# Patient Record
Sex: FEMALE | Race: WHITE
[De-identification: names, ages, dates, MRNs, and addresses within clinical notes are randomized per-mention and may not be internally consistent; named-entity substitution may affect disease eponyms.]

---

## 2017-12-21 ENCOUNTER — HOSPITAL ENCOUNTER (EMERGENCY)
Dept: HOSPITAL 61 - ER | Age: 32
Discharge: HOME | End: 2017-12-21
Payer: SELF-PAY

## 2017-12-21 VITALS — HEIGHT: 67 IN | WEIGHT: 150 LBS | BODY MASS INDEX: 23.54 KG/M2

## 2017-12-21 VITALS — DIASTOLIC BLOOD PRESSURE: 74 MMHG | SYSTOLIC BLOOD PRESSURE: 135 MMHG

## 2017-12-21 DIAGNOSIS — S39.011A: Primary | ICD-10-CM

## 2017-12-21 DIAGNOSIS — Z87.442: ICD-10-CM
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
## 2017-12-21 LAB
ALBUMIN SERPL-MCNC: 4.1 G/DL (ref 3.4–5)
ALBUMIN/GLOB SERPL: 1.2 {RATIO} (ref 1–1.7)
ALP SERPL-CCNC: 56 U/L (ref 46–116)
ALT SERPL-CCNC: 20 U/L (ref 14–59)
ANION GAP SERPL CALC-SCNC: 9 MMOL/L (ref 6–14)
AST SERPL-CCNC: 17 U/L (ref 15–37)
BACTERIA #/AREA URNS HPF: (no result) /HPF
BASOPHILS # BLD AUTO: 0 X10^3/UL (ref 0–0.2)
BASOPHILS NFR BLD: 1 % (ref 0–3)
BILIRUB SERPL-MCNC: 0.5 MG/DL (ref 0.2–1)
BILIRUB UR QL STRIP: NEGATIVE
BUN SERPL-MCNC: 10 MG/DL (ref 7–20)
BUN/CREAT SERPL: 13 (ref 6–20)
CALCIUM SERPL-MCNC: 8.8 MG/DL (ref 8.5–10.1)
CHLORIDE SERPL-SCNC: 104 MMOL/L (ref 98–107)
CO2 SERPL-SCNC: 29 MMOL/L (ref 21–32)
CREAT SERPL-MCNC: 0.8 MG/DL (ref 0.6–1)
EOSINOPHIL NFR BLD: 1 % (ref 0–3)
ERYTHROCYTE [DISTWIDTH] IN BLOOD BY AUTOMATED COUNT: 13.9 % (ref 11.5–14.5)
GFR SERPLBLD BASED ON 1.73 SQ M-ARVRAT: 83.1 ML/MIN
GLOBULIN SER-MCNC: 3.4 G/DL (ref 2.2–3.8)
GLUCOSE SERPL-MCNC: 93 MG/DL (ref 70–99)
GLUCOSE UR STRIP-MCNC: NEGATIVE MG/DL
HCT VFR BLD CALC: 44.3 % (ref 36–47)
HGB BLD-MCNC: 14.8 G/DL (ref 12–15.5)
LYMPHOCYTES # BLD: 2.4 X10^3/UL (ref 1–4.8)
LYMPHOCYTES NFR BLD AUTO: 37 % (ref 24–48)
MCH RBC QN AUTO: 32 PG (ref 25–35)
MCHC RBC AUTO-ENTMCNC: 34 G/DL (ref 31–37)
MCV RBC AUTO: 95 FL (ref 79–100)
MONOCYTES NFR BLD: 7 % (ref 0–9)
NEUTROPHILS NFR BLD AUTO: 55 % (ref 31–73)
NITRITE UR QL STRIP: NEGATIVE
PH UR STRIP: 7 [PH]
PLATELET # BLD AUTO: 235 X10^3/UL (ref 140–400)
POTASSIUM SERPL-SCNC: 3.9 MMOL/L (ref 3.5–5.1)
PROT SERPL-MCNC: 7.5 G/DL (ref 6.4–8.2)
PROT UR STRIP-MCNC: NEGATIVE MG/DL
RBC # BLD AUTO: 4.68 X10^6/UL (ref 3.5–5.4)
RBC #/AREA URNS HPF: 0 /HPF (ref 0–2)
SODIUM SERPL-SCNC: 142 MMOL/L (ref 136–145)
SP GR UR STRIP: 1.01
SQUAMOUS #/AREA URNS LPF: (no result) /LPF
UROBILINOGEN UR-MCNC: 0.2 MG/DL
WBC # BLD AUTO: 6.5 X10^3/UL (ref 4–11)
WBC #/AREA URNS HPF: (no result) /HPF (ref 0–4)

## 2017-12-21 PROCEDURE — 99284 EMERGENCY DEPT VISIT MOD MDM: CPT

## 2017-12-21 PROCEDURE — 36415 COLL VENOUS BLD VENIPUNCTURE: CPT

## 2017-12-21 PROCEDURE — 81001 URINALYSIS AUTO W/SCOPE: CPT

## 2017-12-21 PROCEDURE — 85025 COMPLETE CBC W/AUTO DIFF WBC: CPT

## 2017-12-21 PROCEDURE — 96374 THER/PROPH/DIAG INJ IV PUSH: CPT

## 2017-12-21 PROCEDURE — 80053 COMPREHEN METABOLIC PANEL: CPT

## 2017-12-21 PROCEDURE — 96375 TX/PRO/DX INJ NEW DRUG ADDON: CPT

## 2017-12-21 PROCEDURE — 96361 HYDRATE IV INFUSION ADD-ON: CPT

## 2017-12-21 NOTE — PHYS DOC
Adult General


Chief Complaint


Chief Complaint:  FLANK PAIN





HPI


HPI





Patient is a 32  year old female presents to the emergency department with a 24-

hour history of right flank pain. Patient states she has a history of renal 

stones in his had previous lithotripsy and surgical removal of the stones 

however she cannot recall the facility where these procedures occurred. Patient 

states she's had no nausea, no vomiting, no urinary symptoms. The pain is 

localized to the right flank.





Review of Systems


Review of Systems





Constitutional: Denies fever or chills []


Eyes: Denies change in visual acuity, redness, or eye pain []


HENT: Denies nasal congestion or sore throat []


Respiratory: Denies cough or shortness of breath []


Cardiovascular: No additional information not addressed in HPI []


GI: Denies abdominal pain, nausea, vomiting, bloody stools or diarrhea []


: Denies dysuria or hematuria []


Musculoskeletal: Denies back pain or joint pain, complain of right flank pain []


Integument: Denies rash or skin lesions []


Neurologic: Denies headache, focal weakness or sensory changes []


Endocrine: Denies polyuria or polydipsia []





All other systems were reviewed and found to be within normal limits, except as 

documented in this note.





Current Medications


Current Medications





Current Medications








 Medications


  (Trade)  Dose


 Ordered  Sig/Holger  Start Time


 Stop Time Status Last Admin


Dose Admin


 


 Ketorolac


 Tromethamine


  (Toradol)  30 mg  1X  ONCE  12/21/17 11:00


 12/21/17 11:01 DC 12/21/17 11:09


30 MG


 


 Ondansetron HCl


  (Zofran)  4 mg  1X  ONCE  12/21/17 11:00


 12/21/17 11:01 DC 12/21/17 11:09


4 MG


 


 Sodium Chloride  1,000 ml @ 


 1,000 mls/hr  1X  ONCE  12/21/17 11:00


 12/21/17 11:59  12/21/17 11:09


1,000 MLS/HR











Allergies


Allergies





Allergies








Coded Allergies Type Severity Reaction Last Updated Verified


 


  No Known Drug Allergies    12/21/17 No











Physical Exam


Physical Exam





Constitutional: Well developed, well nourished, no acute distress, non-toxic 

appearance. []


Cardiovascular:Heart rate regular rhythm, no murmur []


Lungs & Thorax:  Bilateral breath sounds clear to auscultation []


Abdomen: Bowel sounds normal, soft, no tenderness, no masses, no pulsatile 

masses. [] 


Back: No tenderness, right CVA tenderness. []





Current Patient Data


Vital Signs





 Vital Signs








  Date Time  Temp Pulse Resp B/P (MAP) Pulse Ox O2 Delivery O2 Flow Rate FiO2


 


12/21/17 10:43 98.3 72 18 135/74 (94) 99 Room Air  





 98.3       








Lab Values





 Laboratory Tests








Test


  12/21/17


10:45 12/21/17


10:47


 


Urine Collection Type Unknown   


 


Urine Color Yellow   


 


Urine Clarity Clear   


 


Urine pH 7.0   


 


Urine Specific Gravity 1.010   


 


Urine Protein


  Negative mg/dL


(NEG-TRACE) 


 


 


Urine Glucose (UA)


  Negative mg/dL


(NEG) 


 


 


Urine Ketones (Stick)


  Negative mg/dL


(NEG) 


 


 


Urine Blood


  Negative (NEG)


  


 


 


Urine Nitrite


  Negative (NEG)


  


 


 


Urine Bilirubin


  Negative (NEG)


  


 


 


Urine Urobilinogen Dipstick


  0.2 mg/dL (0.2


mg/dL) 


 


 


Urine Leukocyte Esterase


  Negative (NEG)


  


 


 


Urine RBC 0 /HPF (0-2)   


 


Urine WBC


  Occ /HPF (0-4)


  


 


 


Urine Squamous Epithelial


Cells Mod /LPF  


  


 


 


Urine Bacteria


  Few /HPF


(0-FEW) 


 


 


White Blood Count


  


  6.5 x10^3/uL


(4.0-11.0)


 


Red Blood Count


  


  4.68 x10^6/uL


(3.50-5.40)


 


Hemoglobin


  


  14.8 g/dL


(12.0-15.5)


 


Hematocrit


  


  44.3 %


(36.0-47.0)


 


Mean Corpuscular Volume


  


  95 fL ()


 


 


Mean Corpuscular Hemoglobin  32 pg (25-35)  


 


Mean Corpuscular Hemoglobin


Concent 


  34 g/dL


(31-37)


 


Red Cell Distribution Width


  


  13.9 %


(11.5-14.5)


 


Platelet Count


  


  235 x10^3/uL


(140-400)


 


Neutrophils (%) (Auto)  55 % (31-73)  


 


Lymphocytes (%) (Auto)  37 % (24-48)  


 


Monocytes (%) (Auto)  7 % (0-9)  


 


Eosinophils (%) (Auto)  1 % (0-3)  


 


Basophils (%) (Auto)  1 % (0-3)  


 


Neutrophils # (Auto)


  


  3.5 x10^3uL


(1.8-7.7)


 


Lymphocytes # (Auto)


  


  2.4 x10^3/uL


(1.0-4.8)


 


Monocytes # (Auto)


  


  0.5 x10^3/uL


(0.0-1.1)


 


Eosinophils # (Auto)


  


  0.1 x10^3/uL


(0.0-0.7)


 


Basophils # (Auto)


  


  0.0 x10^3/uL


(0.0-0.2)


 


Sodium Level


  


  142 mmol/L


(136-145)


 


Potassium Level


  


  3.9 mmol/L


(3.5-5.1)


 


Chloride Level


  


  104 mmol/L


()


 


Carbon Dioxide Level


  


  29 mmol/L


(21-32)


 


Anion Gap  9 (6-14)  


 


Blood Urea Nitrogen


  


  10 mg/dL


(7-20)


 


Creatinine


  


  0.8 mg/dL


(0.6-1.0)


 


Estimated GFR


(Cockcroft-Gault) 


  83.1  


 


 


BUN/Creatinine Ratio  13 (6-20)  


 


Glucose Level


  


  93 mg/dL


(70-99)


 


Calcium Level


  


  8.8 mg/dL


(8.5-10.1)


 


Total Bilirubin


  


  0.5 mg/dL


(0.2-1.0)


 


Aspartate Amino Transferase


(AST) 


  17 U/L (15-37)


 


 


Alanine Aminotransferase (ALT)


  


  20 U/L (14-59)


 


 


Alkaline Phosphatase


  


  56 U/L


()


 


Total Protein


  


  7.5 g/dL


(6.4-8.2)


 


Albumin


  


  4.1 g/dL


(3.4-5.0)


 


Albumin/Globulin Ratio  1.2 (1.0-1.7)  





 Laboratory Tests


12/21/17 10:47








 Laboratory Tests


12/21/17 10:47











EKG


EKG


[]





Radiology/Procedures


Radiology/Procedures


[]





Course & Med Decision Making


Course & Med Decision Making


Pertinent Labs and Imaging studies reviewed. (See chart for details)





[]Patient reports relief of discomfort after Toradol. CBC, CMP and urine within 

normal limits. Plan will be to discharge home with anti-inflammatory, diagnosis 

musculoskeletal strain





Dragon Disclaimer


Dragon Disclaimer


This electronic medical record was generated, in whole or in part, using a 

voice recognition dictation system.





Departure


Departure


Impression:  


 Primary Impression:  


 Muscle strain


Disposition:  01 HOME, SELF-CARE


Condition:  STABLE


Referrals:  


NO PCP (PCP)








[facility]


Patient Instructions:  Muscle Strain


Scripts


Naproxen (NAPROSYN) 500 Mg Tablet


500 MG PO BID, #20 TAB


   Prov: BRITANY ZHANG         12/21/17 


Cyclobenzaprine Hcl (CYCLOBENZAPRINE HCL) 10 Mg Tablet


10 MG PO TID, #20 TAB


   Prov: BRITANY ZHANG         12/21/17











BRITANY ZHANG Dec 21, 2017 10:45

## 2019-02-25 ENCOUNTER — HOSPITAL ENCOUNTER (EMERGENCY)
Dept: HOSPITAL 61 - ER | Age: 34
Discharge: HOME | End: 2019-02-25
Payer: SELF-PAY

## 2019-02-25 VITALS — BODY MASS INDEX: 25.47 KG/M2 | HEIGHT: 67 IN | WEIGHT: 162.25 LBS

## 2019-02-25 VITALS — DIASTOLIC BLOOD PRESSURE: 75 MMHG | SYSTOLIC BLOOD PRESSURE: 129 MMHG

## 2019-02-25 DIAGNOSIS — F17.210: ICD-10-CM

## 2019-02-25 DIAGNOSIS — J20.9: Primary | ICD-10-CM

## 2019-02-25 DIAGNOSIS — Z98.51: ICD-10-CM

## 2019-02-25 DIAGNOSIS — Z87.442: ICD-10-CM

## 2019-02-25 PROCEDURE — 71046 X-RAY EXAM CHEST 2 VIEWS: CPT

## 2019-02-25 PROCEDURE — 99283 EMERGENCY DEPT VISIT LOW MDM: CPT

## 2019-02-25 NOTE — PHYS DOC
Past Medical History


Past Medical History:  Kidney Stone


Past Surgical History:  Tubal ligation, Other


Additional Past Surgical Histo:  lithotripsy, cystocopy with stone removal


Additional Information:  


3-4 cigarettes daily


Alcohol Use:  Rarely


Drug Use:  None





Adult General


Chief Complaint


Chief Complaint:  Congestion





HPI


HPI





Patient is a 33  year old female presented to ER today for evaluation of cough, 

nasal congestion for about 3 days. Patient denies any chest pain, no fever.


She is a smoker. She denies any abdominal pain, no chest pain, no trouble 

breathing.





Review of Systems


Review of Systems





Constitutional: Denies fever or chills []


Eyes: Denies change in visual acuity, redness, or eye pain []


HENT: positive for  nasal congestion , NO sore throat []


Respiratory:  POSITIVE FOR cough, NO shortness of breath []


Cardiovascular: No additional information not addressed in HPI []


GI: Denies abdominal pain, nausea, vomiting, bloody stools or diarrhea []


: Denies dysuria or hematuria []


Musculoskeletal: Denies back pain or joint pain []


Integument: Denies rash or skin lesions []


Neurologic: Denies headache, focal weakness or sensory changes []


Endocrine: Denies polyuria or polydipsia []





All other systems were reviewed and found to be within normal limits, except as 

documented in this note.





Allergies


Allergies





Allergies








Coded Allergies Type Severity Reaction Last Updated Verified


 


  No Known Drug Allergies    17 No











Physical Exam


Physical Exam





Constitutional: Well developed, well nourished, no acute distress, non-toxic 

appearance. []


HENT: Normocephalic, atraumatic, bilateral external ears normal, oropharynx 

moist, no oral exudates, nose normal. []


Eyes: PERRLA, EOMI, conjunctiva normal, no discharge. [] 


Neck: Normal range of motion, no tenderness, supple, no stridor. [] 


Cardiovascular:Heart rate regular rhythm, no murmur []


Lungs & Thorax:  Bilateral breath sounds clear to auscultation []


Abdomen: Bowel sounds normal, soft, no tenderness, no masses, no pulsatile 

masses. [] 


Skin: Warm, dry, no erythema, no rash. [] 


Back: No tenderness, no CVA tenderness. [] 


Extremities: No tenderness, no cyanosis, no clubbing, ROM intact, no edema. [] 


Neurologic: Alert and oriented X 3, normal motor function, normal sensory 

function, no focal deficits noted. []


Psychologic: Affect normal, judgement normal, mood normal. []





Current Patient Data


Vital Signs





 Vital Signs








  Date Time  Temp Pulse Resp B/P (MAP) Pulse Ox O2 Delivery O2 Flow Rate FiO2


 


19 07:10 98.9 80 18 128/69 (88) 100 Room Air  





 98.9       











EKG


EKG


[]





Radiology/Procedures


Radiology/Procedures


[][facility]


 [address] [place] [address]


 [phone]


 


 IMAGING REPORT





 Signed





PATIENT: ROSALIO MICHAEL ACCOUNT: [id number] MRN#: [id number]


: 1985 LOCATION: ER AGE: 33


SEX: F EXAM DT: 19 ACCESSION#: [id number]


STATUS: REG ER ORD. PHYSICIAN: JOHANNA SINGLETON DO 


REASON: COUGH, SOA


PROCEDURE: CHEST PA & LATERAL





CHEST PA   LATERAL


 


History: COUGH, CONGESTION SINCE 19 


 


Comparison: None.


 


Findings: 


The cardiomediastinal silhouette is normal. Pulmonary vasculature is 


normal. The lungs are clear. No pleural effusion or pneumothorax is seen. 


There is no acute bone abnormality. 


 


IMPRESSION: 


No acute cardiopulmonary process. 


 


 


Electronically signed by: Jorden Pérez MD (2019 8:19 AM) [id number]














DICTATED and SIGNED BY:     JORDEN PÉREZ MD


DATE:     19 0818





Course & Med Decision Making


Course & Med Decision Making


Pertinent Labs and Imaging studies reviewed. (See chart for details)





[]





Dragon Disclaimer


Dragon Disclaimer


This electronic medical record was generated, in whole or in part, using a 

voice recognition dictation system.





Departure


Departure


Impression:  


 Primary Impression:  


 Acute bronchitis


Disposition:   HOME, SELF-CARE


Condition:  IMPROVED


Referrals:  


NO PCP (PCP)


FOLLOW UP WITH YOUR DOCTOR THIS WEEK.


Patient Instructions:  Bronchitis


Scripts


Prednisone (PREDNISONE **) 10 Mg Tablet


40 MG PO DAILY for 5 Days, #20 TAB 0 Refills


   Prov: JOHANNA SINGLETON DO         19 


Azithromycin (ZITHROMAX) 250 Mg Tablet


1 PKG PO UD, #6 TAB


   Prov: JHOANNA SINGLETON DO         19











JOHANNA SINGLETON DO 2019 08:29

## 2019-02-25 NOTE — RAD
CHEST PA   LATERAL

 

History: COUGH, CONGESTION SINCE FRIDAY 02/22/19 

 

Comparison: None.

 

Findings: 

The cardiomediastinal silhouette is normal. Pulmonary vasculature is 

normal. The lungs are clear. No pleural effusion or pneumothorax is seen. 

There is no acute bone abnormality. 

 

IMPRESSION: 

No acute cardiopulmonary process. 

 

 

Electronically signed by: Jorden Pérez MD (2/25/2019 8:19 AM) [id number]